# Patient Record
Sex: FEMALE | Race: WHITE | NOT HISPANIC OR LATINO
[De-identification: names, ages, dates, MRNs, and addresses within clinical notes are randomized per-mention and may not be internally consistent; named-entity substitution may affect disease eponyms.]

---

## 2018-01-06 ENCOUNTER — TRANSCRIPTION ENCOUNTER (OUTPATIENT)
Age: 33
End: 2018-01-06

## 2018-09-06 ENCOUNTER — TRANSCRIPTION ENCOUNTER (OUTPATIENT)
Age: 33
End: 2018-09-06

## 2018-09-07 ENCOUNTER — EMERGENCY (EMERGENCY)
Facility: HOSPITAL | Age: 33
LOS: 1 days | Discharge: HOME | End: 2018-09-07
Attending: EMERGENCY MEDICINE
Payer: COMMERCIAL

## 2018-09-07 VITALS
DIASTOLIC BLOOD PRESSURE: 87 MMHG | TEMPERATURE: 98 F | SYSTOLIC BLOOD PRESSURE: 135 MMHG | RESPIRATION RATE: 16 BRPM | HEIGHT: 62 IN | HEART RATE: 78 BPM | WEIGHT: 115.08 LBS | OXYGEN SATURATION: 99 %

## 2018-09-07 PROCEDURE — 99283 EMERGENCY DEPT VISIT LOW MDM: CPT

## 2018-09-07 PROCEDURE — 12011 RPR F/E/E/N/L/M 2.5 CM/<: CPT

## 2018-09-07 PROCEDURE — 99285 EMERGENCY DEPT VISIT HI MDM: CPT | Mod: 25

## 2018-09-07 NOTE — ED PROVIDER NOTE - OBJECTIVE STATEMENT
34yo female pt, no PMHx, ambulatory c/o left eyebrow laceration s/p injury today. Pt stated she walked into a glass door and noticed a small laceration on left eyebrow at work. Denies LOC, eye pain or visual changes. Denies other injuries. Denies headache, dizziness or N/V. Denies sensory changes or weakness to extremities. Denies neck or back pain. Denies CP/SOB/ABD pain. Denies fever, chills or recent sickness. Tdap- UTDed. 32yo female pt, no PMHx, ambulatory c/o left eyebrow laceration s/p injury today. Pt stated she walked into a glass door and noticed a small laceration on left eyebrow at work. Denies LOC, eye pain or visual changes. Denies other injuries. Denies headache, dizziness or N/V. Denies sensory changes or weakness to extremities. Denies neck or back pain. Denies CP/SOB/ABD pain. Denies fever, chills or recent sickness. Tdap- UTDed.       Attending note. Patient was seen in the fast track #3. Agree with the above. Patient's complaint laceration above the left brow which he walked into glass wall this evening. Patient has no headache, dizziness, nausea or vomiting. Patient has no neck pain or change in vision. Patient states her tetanus is up-to-date.  Patient request plastics.

## 2018-09-07 NOTE — ED PROVIDER NOTE - PHYSICAL EXAMINATION
NAD, VSS, Afebrile, + PERRL with full EOMs, + Left eye brow 1cm superficial laceration, no bleeding, No facial patt tender, No spinal tender, Neuro- intact. NAD, VSS, Afebrile, + PERRL with full EOMs, + Left eye brow 1cm superficial laceration, no bleeding, No facial patt tender, No spinal tender, Neuro- intact.       Attending note. Patient is alert and in no acute distress. Patient is a 1.5 cm laceration of the left brow. Pupils are 2 mm equal reactive. Extraocular muscles are intact. TMJ is nontender. Maxilla and nose are nontender. Next nontender.

## 2018-09-07 NOTE — ED PROVIDER NOTE - MEDICAL DECISION MAKING DETAILS
Attending note-laceration of left brow 1.5 cm. Patient requested a plastic surgeon. Tetanus is up-to-date.

## 2018-09-07 NOTE — ED ADULT NURSE NOTE - OBJECTIVE STATEMENT
34 yo female complaining of laceration on left eyebrow "I was walking in the bank and did not see the glass door and this happened". Pt did not LOC, denies vomiting, the glasses in between the door and her face made the lac. No active bleeding, MD at the bedside, vitals WNL, no sings of acute distress noted at this moment. Will continue to monitor closely.

## 2018-09-07 NOTE — ED ADULT NURSE NOTE - NSIMPLEMENTINTERV_GEN_ALL_ED
Implemented All Universal Safety Interventions:  [place] to call system. Call bell, personal items and telephone within reach. Instruct patient to call for assistance. Room bathroom lighting operational. Non-slip footwear when patient is off stretcher. Physically safe environment: no spills, clutter or unnecessary equipment. Stretcher in lowest position, wheels locked, appropriate side rails in place.

## 2019-03-26 ENCOUNTER — RESULT REVIEW (OUTPATIENT)
Age: 34
End: 2019-03-26

## 2019-06-05 ENCOUNTER — APPOINTMENT (OUTPATIENT)
Dept: ANTEPARTUM | Facility: CLINIC | Age: 34
End: 2019-06-05
Payer: COMMERCIAL

## 2019-06-05 ENCOUNTER — ASOB RESULT (OUTPATIENT)
Age: 34
End: 2019-06-05

## 2019-06-05 PROCEDURE — 99201 OFFICE OUTPATIENT NEW 10 MINUTES: CPT | Mod: 25

## 2019-06-05 PROCEDURE — 76805 OB US >/= 14 WKS SNGL FETUS: CPT

## 2019-06-24 ENCOUNTER — APPOINTMENT (OUTPATIENT)
Dept: ANTEPARTUM | Facility: CLINIC | Age: 34
End: 2019-06-24
Payer: COMMERCIAL

## 2019-06-24 ENCOUNTER — ASOB RESULT (OUTPATIENT)
Age: 34
End: 2019-06-24

## 2019-06-24 ENCOUNTER — LABORATORY RESULT (OUTPATIENT)
Age: 34
End: 2019-06-24

## 2019-06-24 ENCOUNTER — OUTPATIENT (OUTPATIENT)
Dept: OUTPATIENT SERVICES | Facility: HOSPITAL | Age: 34
LOS: 1 days | End: 2019-06-24
Payer: COMMERCIAL

## 2019-06-24 DIAGNOSIS — Z01.818 ENCOUNTER FOR OTHER PREPROCEDURAL EXAMINATION: ICD-10-CM

## 2019-06-24 PROCEDURE — 88235 TISSUE CULTURE PLACENTA: CPT

## 2019-06-24 PROCEDURE — 88285 CHROMOSOME COUNT ADDITIONAL: CPT

## 2019-06-24 PROCEDURE — 59000 AMNIOCENTESIS DIAGNOSTIC: CPT

## 2019-06-24 PROCEDURE — 99213 OFFICE O/P EST LOW 20 MIN: CPT | Mod: 25

## 2019-06-24 PROCEDURE — 88291 CYTO/MOLECULAR REPORT: CPT

## 2019-06-24 PROCEDURE — 76811 OB US DETAILED SNGL FETUS: CPT

## 2019-06-24 PROCEDURE — 88275 CYTOGENETICS 100-300: CPT

## 2019-06-24 PROCEDURE — 36415 COLL VENOUS BLD VENIPUNCTURE: CPT

## 2019-06-24 PROCEDURE — 88280 CHROMOSOME KARYOTYPE STUDY: CPT

## 2019-06-24 PROCEDURE — 76817 TRANSVAGINAL US OBSTETRIC: CPT | Mod: 59

## 2019-06-24 PROCEDURE — 88269 CHROMOSOME ANALYS AMNIOTIC: CPT

## 2019-06-24 PROCEDURE — 88271 CYTOGENETICS DNA PROBE: CPT

## 2019-06-25 LAB — SUBTELOMERE ANALYSIS BLD/T FISH-IMP: SIGNIFICANT CHANGE UP

## 2019-07-01 LAB — CHROM ANALY OVERALL INTERP SPEC-IMP: SIGNIFICANT CHANGE UP

## 2019-07-08 ENCOUNTER — APPOINTMENT (OUTPATIENT)
Dept: ANTEPARTUM | Facility: CLINIC | Age: 34
End: 2019-07-08
Payer: COMMERCIAL

## 2019-07-08 ENCOUNTER — ASOB RESULT (OUTPATIENT)
Age: 34
End: 2019-07-08

## 2019-07-08 PROCEDURE — 93325 DOPPLER ECHO COLOR FLOW MAPG: CPT

## 2019-07-08 PROCEDURE — 76827 ECHO EXAM OF FETAL HEART: CPT

## 2019-07-08 PROCEDURE — 76825 ECHO EXAM OF FETAL HEART: CPT

## 2019-07-29 ENCOUNTER — APPOINTMENT (OUTPATIENT)
Dept: ANTEPARTUM | Facility: CLINIC | Age: 34
End: 2019-07-29
Payer: COMMERCIAL

## 2019-07-29 ENCOUNTER — ASOB RESULT (OUTPATIENT)
Age: 34
End: 2019-07-29

## 2019-07-29 PROCEDURE — 76816 OB US FOLLOW-UP PER FETUS: CPT

## 2019-08-26 ENCOUNTER — ASOB RESULT (OUTPATIENT)
Age: 34
End: 2019-08-26

## 2019-08-26 ENCOUNTER — APPOINTMENT (OUTPATIENT)
Dept: ANTEPARTUM | Facility: CLINIC | Age: 34
End: 2019-08-26
Payer: COMMERCIAL

## 2019-08-26 PROCEDURE — 76816 OB US FOLLOW-UP PER FETUS: CPT

## 2019-09-20 ENCOUNTER — APPOINTMENT (OUTPATIENT)
Dept: ANTEPARTUM | Facility: CLINIC | Age: 34
End: 2019-09-20
Payer: COMMERCIAL

## 2019-09-20 ENCOUNTER — ASOB RESULT (OUTPATIENT)
Age: 34
End: 2019-09-20

## 2019-09-20 PROCEDURE — 76816 OB US FOLLOW-UP PER FETUS: CPT

## 2019-10-01 ENCOUNTER — ASOB RESULT (OUTPATIENT)
Age: 34
End: 2019-10-01

## 2019-10-01 ENCOUNTER — APPOINTMENT (OUTPATIENT)
Dept: ANTEPARTUM | Facility: CLINIC | Age: 34
End: 2019-10-01
Payer: COMMERCIAL

## 2019-10-01 PROCEDURE — 76820 UMBILICAL ARTERY ECHO: CPT

## 2019-10-01 PROCEDURE — 76819 FETAL BIOPHYS PROFIL W/O NST: CPT

## 2019-10-08 ENCOUNTER — APPOINTMENT (OUTPATIENT)
Dept: ANTEPARTUM | Facility: CLINIC | Age: 34
End: 2019-10-08
Payer: COMMERCIAL

## 2019-10-08 ENCOUNTER — ASOB RESULT (OUTPATIENT)
Age: 34
End: 2019-10-08

## 2019-10-08 PROCEDURE — 76819 FETAL BIOPHYS PROFIL W/O NST: CPT

## 2019-10-08 PROCEDURE — 76820 UMBILICAL ARTERY ECHO: CPT

## 2019-10-15 ENCOUNTER — ASOB RESULT (OUTPATIENT)
Age: 34
End: 2019-10-15

## 2019-10-15 ENCOUNTER — APPOINTMENT (OUTPATIENT)
Dept: ANTEPARTUM | Facility: CLINIC | Age: 34
End: 2019-10-15
Payer: COMMERCIAL

## 2019-10-15 ENCOUNTER — OUTPATIENT (OUTPATIENT)
Dept: OUTPATIENT SERVICES | Facility: HOSPITAL | Age: 34
LOS: 1 days | End: 2019-10-15
Payer: COMMERCIAL

## 2019-10-15 DIAGNOSIS — Z01.818 ENCOUNTER FOR OTHER PREPROCEDURAL EXAMINATION: ICD-10-CM

## 2019-10-15 PROCEDURE — 76820 UMBILICAL ARTERY ECHO: CPT | Mod: 26

## 2019-10-15 PROCEDURE — 76816 OB US FOLLOW-UP PER FETUS: CPT

## 2019-10-15 PROCEDURE — 76818 FETAL BIOPHYS PROFILE W/NST: CPT

## 2019-10-15 PROCEDURE — 76818 FETAL BIOPHYS PROFILE W/NST: CPT | Mod: 26

## 2019-10-15 PROCEDURE — 76820 UMBILICAL ARTERY ECHO: CPT

## 2019-10-18 ENCOUNTER — APPOINTMENT (OUTPATIENT)
Dept: ANTEPARTUM | Facility: CLINIC | Age: 34
End: 2019-10-18

## 2019-10-22 ENCOUNTER — APPOINTMENT (OUTPATIENT)
Dept: ANTEPARTUM | Facility: CLINIC | Age: 34
End: 2019-10-22
Payer: COMMERCIAL

## 2019-10-22 ENCOUNTER — ASOB RESULT (OUTPATIENT)
Age: 34
End: 2019-10-22

## 2019-10-22 ENCOUNTER — OUTPATIENT (OUTPATIENT)
Dept: OUTPATIENT SERVICES | Facility: HOSPITAL | Age: 34
LOS: 1 days | End: 2019-10-22
Payer: COMMERCIAL

## 2019-10-22 DIAGNOSIS — Z01.818 ENCOUNTER FOR OTHER PREPROCEDURAL EXAMINATION: ICD-10-CM

## 2019-10-22 PROCEDURE — 76818 FETAL BIOPHYS PROFILE W/NST: CPT | Mod: 26

## 2019-10-22 PROCEDURE — 76820 UMBILICAL ARTERY ECHO: CPT | Mod: 26

## 2019-10-22 PROCEDURE — 76818 FETAL BIOPHYS PROFILE W/NST: CPT

## 2019-10-22 PROCEDURE — 76820 UMBILICAL ARTERY ECHO: CPT

## 2019-10-29 ENCOUNTER — ASOB RESULT (OUTPATIENT)
Age: 34
End: 2019-10-29

## 2019-10-29 ENCOUNTER — OUTPATIENT (OUTPATIENT)
Dept: OUTPATIENT SERVICES | Facility: HOSPITAL | Age: 34
LOS: 1 days | End: 2019-10-29
Payer: COMMERCIAL

## 2019-10-29 ENCOUNTER — APPOINTMENT (OUTPATIENT)
Dept: ANTEPARTUM | Facility: CLINIC | Age: 34
End: 2019-10-29
Payer: COMMERCIAL

## 2019-10-29 ENCOUNTER — APPOINTMENT (OUTPATIENT)
Dept: ANTEPARTUM | Facility: CLINIC | Age: 34
End: 2019-10-29

## 2019-10-29 DIAGNOSIS — Z01.818 ENCOUNTER FOR OTHER PREPROCEDURAL EXAMINATION: ICD-10-CM

## 2019-10-29 PROCEDURE — 76820 UMBILICAL ARTERY ECHO: CPT

## 2019-10-29 PROCEDURE — 76820 UMBILICAL ARTERY ECHO: CPT | Mod: 26

## 2019-10-29 PROCEDURE — 76816 OB US FOLLOW-UP PER FETUS: CPT

## 2019-10-29 PROCEDURE — 76818 FETAL BIOPHYS PROFILE W/NST: CPT | Mod: 26

## 2019-10-29 PROCEDURE — 76818 FETAL BIOPHYS PROFILE W/NST: CPT

## 2019-11-05 ENCOUNTER — INPATIENT (INPATIENT)
Facility: HOSPITAL | Age: 34
LOS: 1 days | Discharge: HOME | End: 2019-11-07
Attending: SPECIALIST | Admitting: SPECIALIST
Payer: COMMERCIAL

## 2019-11-05 VITALS — WEIGHT: 130.07 LBS | HEIGHT: 62 IN

## 2019-11-05 DIAGNOSIS — O35.8XX0 MATERNAL CARE FOR OTHER (SUSPECTED) FETAL ABNORMALITY AND DAMAGE, NOT APPLICABLE OR UNSPECIFIED: ICD-10-CM

## 2019-11-05 LAB
BASOPHILS # BLD AUTO: 0.03 K/UL — SIGNIFICANT CHANGE UP (ref 0–0.2)
BASOPHILS NFR BLD AUTO: 0.5 % — SIGNIFICANT CHANGE UP (ref 0–2)
BLD GP AB SCN SERPL QL: NEGATIVE — SIGNIFICANT CHANGE UP
EOSINOPHIL # BLD AUTO: 0.08 K/UL — SIGNIFICANT CHANGE UP (ref 0–0.5)
EOSINOPHIL NFR BLD AUTO: 1.3 % — SIGNIFICANT CHANGE UP (ref 0–6)
HCT VFR BLD CALC: 34.5 % — SIGNIFICANT CHANGE UP (ref 34.5–45)
HGB BLD-MCNC: 11.3 G/DL — LOW (ref 11.5–15.5)
IMM GRANULOCYTES NFR BLD AUTO: 0.3 % — SIGNIFICANT CHANGE UP (ref 0–1.5)
LYMPHOCYTES # BLD AUTO: 1.72 K/UL — SIGNIFICANT CHANGE UP (ref 1–3.3)
LYMPHOCYTES # BLD AUTO: 27.6 % — SIGNIFICANT CHANGE UP (ref 13–44)
MCHC RBC-ENTMCNC: 28.2 PG — SIGNIFICANT CHANGE UP (ref 27–34)
MCHC RBC-ENTMCNC: 32.8 GM/DL — SIGNIFICANT CHANGE UP (ref 32–36)
MCV RBC AUTO: 86 FL — SIGNIFICANT CHANGE UP (ref 80–100)
MONOCYTES # BLD AUTO: 0.42 K/UL — SIGNIFICANT CHANGE UP (ref 0–0.9)
MONOCYTES NFR BLD AUTO: 6.7 % — SIGNIFICANT CHANGE UP (ref 2–14)
NEUTROPHILS # BLD AUTO: 3.96 K/UL — SIGNIFICANT CHANGE UP (ref 1.8–7.4)
NEUTROPHILS NFR BLD AUTO: 63.6 % — SIGNIFICANT CHANGE UP (ref 43–77)
NRBC # BLD: 0 /100 WBCS — SIGNIFICANT CHANGE UP (ref 0–0)
PLATELET # BLD AUTO: 307 K/UL — SIGNIFICANT CHANGE UP (ref 150–400)
RBC # BLD: 4.01 M/UL — SIGNIFICANT CHANGE UP (ref 3.8–5.2)
RBC # FLD: 13.2 % — SIGNIFICANT CHANGE UP (ref 10.3–14.5)
RH IG SCN BLD-IMP: POSITIVE — SIGNIFICANT CHANGE UP
RH IG SCN BLD-IMP: POSITIVE — SIGNIFICANT CHANGE UP
T PALLIDUM AB TITR SER: NEGATIVE — SIGNIFICANT CHANGE UP
WBC # BLD: 6.23 K/UL — SIGNIFICANT CHANGE UP (ref 3.8–10.5)
WBC # FLD AUTO: 6.23 K/UL — SIGNIFICANT CHANGE UP (ref 3.8–10.5)

## 2019-11-05 PROCEDURE — 88307 TISSUE EXAM BY PATHOLOGIST: CPT | Mod: 26

## 2019-11-05 RX ORDER — OXYTOCIN 10 UNIT/ML
2 VIAL (ML) INJECTION
Qty: 30 | Refills: 0 | Status: DISCONTINUED | OUTPATIENT
Start: 2019-11-05 | End: 2019-11-07

## 2019-11-05 RX ORDER — SODIUM CHLORIDE 9 MG/ML
1000 INJECTION, SOLUTION INTRAVENOUS
Refills: 0 | Status: DISCONTINUED | OUTPATIENT
Start: 2019-11-05 | End: 2019-11-07

## 2019-11-05 RX ORDER — AMPICILLIN TRIHYDRATE 250 MG
1 CAPSULE ORAL EVERY 4 HOURS
Refills: 0 | Status: DISCONTINUED | OUTPATIENT
Start: 2019-11-05 | End: 2019-11-05

## 2019-11-05 RX ORDER — OXYTOCIN 10 UNIT/ML
333.33 VIAL (ML) INJECTION
Qty: 20 | Refills: 0 | Status: DISCONTINUED | OUTPATIENT
Start: 2019-11-05 | End: 2019-11-07

## 2019-11-05 RX ORDER — SODIUM CHLORIDE 9 MG/ML
1000 INJECTION, SOLUTION INTRAVENOUS
Refills: 0 | Status: DISCONTINUED | OUTPATIENT
Start: 2019-11-05 | End: 2019-11-05

## 2019-11-05 RX ORDER — AMPICILLIN TRIHYDRATE 250 MG
2 CAPSULE ORAL ONCE
Refills: 0 | Status: COMPLETED | OUTPATIENT
Start: 2019-11-05 | End: 2019-11-05

## 2019-11-05 RX ORDER — CITRIC ACID/SODIUM CITRATE 300-500 MG
15 SOLUTION, ORAL ORAL EVERY 6 HOURS
Refills: 0 | Status: DISCONTINUED | OUTPATIENT
Start: 2019-11-05 | End: 2019-11-05

## 2019-11-05 RX ADMIN — Medication 216 GRAM(S): at 06:05

## 2019-11-05 RX ADMIN — Medication 108 GRAM(S): at 14:29

## 2019-11-05 RX ADMIN — Medication 108 GRAM(S): at 18:26

## 2019-11-05 RX ADMIN — SODIUM CHLORIDE 125 MILLILITER(S): 9 INJECTION, SOLUTION INTRAVENOUS at 06:06

## 2019-11-05 RX ADMIN — Medication 108 GRAM(S): at 10:35

## 2019-11-05 NOTE — PATIENT PROFILE OB - BABY A: DATE/TIME OF DELIVERY
Full range of motion of upper and lower extremities, no joint tenderness/swelling.
05-Nov-2019 18:58

## 2019-11-06 LAB
HCT VFR BLD CALC: 31.8 % — LOW (ref 34.5–45)
HGB BLD-MCNC: 10.2 G/DL — LOW (ref 11.5–15.5)

## 2019-11-06 RX ORDER — IBUPROFEN 200 MG
600 TABLET ORAL EVERY 6 HOURS
Refills: 0 | Status: DISCONTINUED | OUTPATIENT
Start: 2019-11-06 | End: 2019-11-07

## 2019-11-06 RX ORDER — DIPHENHYDRAMINE HCL 50 MG
25 CAPSULE ORAL EVERY 6 HOURS
Refills: 0 | Status: DISCONTINUED | OUTPATIENT
Start: 2019-11-06 | End: 2019-11-07

## 2019-11-06 RX ORDER — TETANUS TOXOID, REDUCED DIPHTHERIA TOXOID AND ACELLULAR PERTUSSIS VACCINE, ADSORBED 5; 2.5; 8; 8; 2.5 [IU]/.5ML; [IU]/.5ML; UG/.5ML; UG/.5ML; UG/.5ML
0.5 SUSPENSION INTRAMUSCULAR ONCE
Refills: 0 | Status: DISCONTINUED | OUTPATIENT
Start: 2019-11-06 | End: 2019-11-07

## 2019-11-06 RX ORDER — GLYCERIN ADULT
1 SUPPOSITORY, RECTAL RECTAL AT BEDTIME
Refills: 0 | Status: DISCONTINUED | OUTPATIENT
Start: 2019-11-06 | End: 2019-11-07

## 2019-11-06 RX ORDER — DIBUCAINE 1 %
1 OINTMENT (GRAM) RECTAL EVERY 6 HOURS
Refills: 0 | Status: DISCONTINUED | OUTPATIENT
Start: 2019-11-06 | End: 2019-11-07

## 2019-11-06 RX ORDER — KETOROLAC TROMETHAMINE 30 MG/ML
30 SYRINGE (ML) INJECTION ONCE
Refills: 0 | Status: DISCONTINUED | OUTPATIENT
Start: 2019-11-06 | End: 2019-11-06

## 2019-11-06 RX ORDER — LANOLIN
1 OINTMENT (GRAM) TOPICAL EVERY 6 HOURS
Refills: 0 | Status: DISCONTINUED | OUTPATIENT
Start: 2019-11-06 | End: 2019-11-07

## 2019-11-06 RX ORDER — PRAMOXINE HYDROCHLORIDE 150 MG/15G
1 AEROSOL, FOAM RECTAL EVERY 4 HOURS
Refills: 0 | Status: DISCONTINUED | OUTPATIENT
Start: 2019-11-06 | End: 2019-11-07

## 2019-11-06 RX ORDER — ACETAMINOPHEN 500 MG
975 TABLET ORAL
Refills: 0 | Status: DISCONTINUED | OUTPATIENT
Start: 2019-11-06 | End: 2019-11-07

## 2019-11-06 RX ORDER — SODIUM CHLORIDE 9 MG/ML
3 INJECTION INTRAMUSCULAR; INTRAVENOUS; SUBCUTANEOUS EVERY 8 HOURS
Refills: 0 | Status: DISCONTINUED | OUTPATIENT
Start: 2019-11-06 | End: 2019-11-07

## 2019-11-06 RX ORDER — IBUPROFEN 200 MG
600 TABLET ORAL EVERY 6 HOURS
Refills: 0 | Status: COMPLETED | OUTPATIENT
Start: 2019-11-06 | End: 2020-10-04

## 2019-11-06 RX ORDER — OXYCODONE HYDROCHLORIDE 5 MG/1
5 TABLET ORAL
Refills: 0 | Status: DISCONTINUED | OUTPATIENT
Start: 2019-11-06 | End: 2019-11-07

## 2019-11-06 RX ORDER — OXYCODONE HYDROCHLORIDE 5 MG/1
5 TABLET ORAL ONCE
Refills: 0 | Status: DISCONTINUED | OUTPATIENT
Start: 2019-11-06 | End: 2019-11-07

## 2019-11-06 RX ORDER — AER TRAVELER 0.5 G/1
1 SOLUTION RECTAL; TOPICAL EVERY 4 HOURS
Refills: 0 | Status: DISCONTINUED | OUTPATIENT
Start: 2019-11-06 | End: 2019-11-07

## 2019-11-06 RX ORDER — SIMETHICONE 80 MG/1
80 TABLET, CHEWABLE ORAL EVERY 4 HOURS
Refills: 0 | Status: DISCONTINUED | OUTPATIENT
Start: 2019-11-06 | End: 2019-11-07

## 2019-11-06 RX ORDER — HYDROCORTISONE 1 %
1 OINTMENT (GRAM) TOPICAL EVERY 6 HOURS
Refills: 0 | Status: DISCONTINUED | OUTPATIENT
Start: 2019-11-06 | End: 2019-11-07

## 2019-11-06 RX ORDER — MAGNESIUM HYDROXIDE 400 MG/1
30 TABLET, CHEWABLE ORAL
Refills: 0 | Status: DISCONTINUED | OUTPATIENT
Start: 2019-11-06 | End: 2019-11-07

## 2019-11-06 RX ORDER — BENZOCAINE 10 %
1 GEL (GRAM) MUCOUS MEMBRANE EVERY 6 HOURS
Refills: 0 | Status: DISCONTINUED | OUTPATIENT
Start: 2019-11-06 | End: 2019-11-07

## 2019-11-06 RX ADMIN — Medication 975 MILLIGRAM(S): at 16:54

## 2019-11-06 RX ADMIN — Medication 600 MILLIGRAM(S): at 11:29

## 2019-11-06 RX ADMIN — Medication 975 MILLIGRAM(S): at 20:46

## 2019-11-06 RX ADMIN — Medication 975 MILLIGRAM(S): at 16:13

## 2019-11-06 RX ADMIN — Medication 600 MILLIGRAM(S): at 18:25

## 2019-11-06 RX ADMIN — Medication 975 MILLIGRAM(S): at 21:16

## 2019-11-06 RX ADMIN — SODIUM CHLORIDE 3 MILLILITER(S): 9 INJECTION INTRAMUSCULAR; INTRAVENOUS; SUBCUTANEOUS at 06:20

## 2019-11-06 RX ADMIN — Medication 30 MILLIGRAM(S): at 01:20

## 2019-11-06 RX ADMIN — Medication 600 MILLIGRAM(S): at 13:15

## 2019-11-06 RX ADMIN — Medication 975 MILLIGRAM(S): at 03:48

## 2019-11-06 RX ADMIN — Medication 1 TABLET(S): at 11:29

## 2019-11-06 RX ADMIN — Medication 975 MILLIGRAM(S): at 03:18

## 2019-11-06 RX ADMIN — Medication 30 MILLIGRAM(S): at 01:50

## 2019-11-06 NOTE — PROGRESS NOTE ADULT - PROBLEM SELECTOR PLAN 1
- Pain well controlled, continue current pain regimen  - Increase ambulation, SCDs when not ambulating  - Continue regular diet    Char PGY1

## 2019-11-06 NOTE — PROGRESS NOTE ADULT - SUBJECTIVE AND OBJECTIVE BOX
OB Progress Note:  PPD#1    S: 33yo  PPD#1 s/p . Patient feels well. Pain is well controlled. She is tolerating a regular diet and passing flatus. She is voiding spontaneously, and ambulating without difficulty. Denies CP/SOB. Denies lightheadedness/dizziness. Denies N/V.    O:  Vitals:  Vital Signs Last 24 Hrs  T(C): 36.7 (2019 05:38), Max: 37.1 (2019 21:25)  T(F): 98.1 (2019 05:38), Max: 98.8 (2019 21:25)  HR: 71 (2019 05:38) (66 - 91)  BP: 92/61 (2019 05:38) (92/61 - 129/67)  BP(mean): 94 (2019 21:25) (85 - 94)  RR: 18 (2019 05:38) (18 - 18)  SpO2: 99% (2019 05:38) (97% - 100%)    MEDICATIONS  (STANDING):  acetaminophen   Tablet .. 975 milliGRAM(s) Oral <User Schedule>  dextrose 5% + lactated ringers. 1000 milliLiter(s) (125 mL/Hr) IV Continuous <Continuous>  diphtheria/tetanus/pertussis (acellular) Vaccine (ADAcel) 0.5 milliLiter(s) IntraMuscular once  ibuprofen  Tablet. 600 milliGRAM(s) Oral every 6 hours  oxytocin Infusion 2 milliUNIT(s)/Min (2 mL/Hr) IV Continuous <Continuous>  oxytocin Infusion 333.333 milliUNIT(s)/Min (1000 mL/Hr) IV Continuous <Continuous>  prenatal multivitamin 1 Tablet(s) Oral daily  sodium chloride 0.9% lock flush 3 milliLiter(s) IV Push every 8 hours      Labs:  Blood type: A Positive  Rubella IgG: RPR: Negative                          11.3<L>   6.23 >-----------< 307    (  @ 06:34 )             34.5                  Physical Exam:  General: NAD  Abdomen: soft, non-tender, non-distended, fundus firm  Vaginal: Lochia wnl  Extremities: No erythema/edema

## 2019-11-07 ENCOUNTER — TRANSCRIPTION ENCOUNTER (OUTPATIENT)
Age: 34
End: 2019-11-07

## 2019-11-07 VITALS
OXYGEN SATURATION: 98 % | RESPIRATION RATE: 18 BRPM | TEMPERATURE: 98 F | HEART RATE: 72 BPM | SYSTOLIC BLOOD PRESSURE: 107 MMHG | DIASTOLIC BLOOD PRESSURE: 72 MMHG

## 2019-11-07 PROCEDURE — 59025 FETAL NON-STRESS TEST: CPT

## 2019-11-07 PROCEDURE — 85018 HEMOGLOBIN: CPT

## 2019-11-07 PROCEDURE — 86780 TREPONEMA PALLIDUM: CPT

## 2019-11-07 PROCEDURE — 59050 FETAL MONITOR W/REPORT: CPT

## 2019-11-07 PROCEDURE — 86901 BLOOD TYPING SEROLOGIC RH(D): CPT

## 2019-11-07 PROCEDURE — 85014 HEMATOCRIT: CPT

## 2019-11-07 PROCEDURE — 86900 BLOOD TYPING SEROLOGIC ABO: CPT

## 2019-11-07 PROCEDURE — 86850 RBC ANTIBODY SCREEN: CPT

## 2019-11-07 PROCEDURE — 88307 TISSUE EXAM BY PATHOLOGIST: CPT

## 2019-11-07 PROCEDURE — 85027 COMPLETE CBC AUTOMATED: CPT

## 2019-11-07 RX ORDER — ACETAMINOPHEN 500 MG
3 TABLET ORAL
Qty: 0 | Refills: 0 | DISCHARGE
Start: 2019-11-07

## 2019-11-07 RX ADMIN — Medication 600 MILLIGRAM(S): at 00:47

## 2019-11-07 RX ADMIN — Medication 975 MILLIGRAM(S): at 03:30

## 2019-11-07 RX ADMIN — Medication 600 MILLIGRAM(S): at 06:46

## 2019-11-07 RX ADMIN — Medication 975 MILLIGRAM(S): at 03:00

## 2019-11-07 RX ADMIN — Medication 600 MILLIGRAM(S): at 07:16

## 2019-11-07 RX ADMIN — Medication 975 MILLIGRAM(S): at 09:38

## 2019-11-07 RX ADMIN — Medication 600 MILLIGRAM(S): at 00:17

## 2019-11-07 RX ADMIN — Medication 975 MILLIGRAM(S): at 10:10

## 2019-11-07 NOTE — DISCHARGE NOTE OB - CARE PLAN
Principal Discharge DX:	Normal spontaneous vaginal delivery  Goal:	home  Assessment and plan of treatment:	status post  and nl postpartum care

## 2019-11-07 NOTE — DISCHARGE NOTE OB - PATIENT PORTAL LINK FT
You can access the FollowMyHealth Patient Portal offered by [facility] by registering at the following website: [email or web address]. By joining [facility]’s FollowMyHealth portal, you will also be able to view your health information using other applications (apps) compatible with our system.

## 2019-11-07 NOTE — DISCHARGE NOTE OB - CARE PROVIDER_API CALL
Monica Jack)  OBSGYN  General  [facility] [id number]  Phone: [phone]  Fax: [phone]  Follow Up Time:

## 2019-11-07 NOTE — PROGRESS NOTE ADULT - SUBJECTIVE AND OBJECTIVE BOX
DELFINO HERNANDEZ  MRN-[id number]  34y    Subjective:  Pt is doing well.  AMbulating, tolerating diet, lochia wnl, no HA< no visual changes.  Pain controlled,    Vital Signs Last 24 Hrs  T(C): 36.5 (07 Nov 2019 05:00), Max: 36.8 (06 Nov 2019 17:33)  T(F): 97.7 (07 Nov 2019 05:00), Max: 98.3 (06 Nov 2019 17:33)  HR: 72 (07 Nov 2019 05:00) (72 - 90)  BP: 107/72 (07 Nov 2019 05:00) (107/72 - 109/73)  BP(mean): --  RR: 18 (07 Nov 2019 05:00) (18 - 18)  SpO2: 98% (07 Nov 2019 05:00) (98% - 98%)    I&O's Summary                            10.2   x     )-----------( x        ( 06 Nov 2019 09:09 )             31.8       Allergies    No Known Allergies    Intolerances        MEDICATIONS  (STANDING):  acetaminophen   Tablet .. 975 milliGRAM(s) Oral <User Schedule>  dextrose 5% + lactated ringers. 1000 milliLiter(s) (125 mL/Hr) IV Continuous <Continuous>  diphtheria/tetanus/pertussis (acellular) Vaccine (ADAcel) 0.5 milliLiter(s) IntraMuscular once  ibuprofen  Tablet. 600 milliGRAM(s) Oral every 6 hours  oxytocin Infusion 2 milliUNIT(s)/Min (2 mL/Hr) IV Continuous <Continuous>  oxytocin Infusion 333.333 milliUNIT(s)/Min (1000 mL/Hr) IV Continuous <Continuous>  prenatal multivitamin 1 Tablet(s) Oral daily  sodium chloride 0.9% lock flush 3 milliLiter(s) IV Push every 8 hours    MEDICATIONS  (PRN):  benzocaine 20%/menthol 0.5% Spray 1 Spray(s) Topical every 6 hours PRN for Perineal discomfort  dibucaine 1% Ointment 1 Application(s) Topical every 6 hours PRN Perineal discomfort  diphenhydrAMINE 25 milliGRAM(s) Oral every 6 hours PRN Pruritus  glycerin Suppository - Adult 1 Suppository(s) Rectal at bedtime PRN Constipation  hydrocortisone 1% Cream 1 Application(s) Topical every 6 hours PRN Moderate Pain (4-6)  lanolin Ointment 1 Application(s) Topical every 6 hours PRN nipple soreness  magnesium hydroxide Suspension 30 milliLiter(s) Oral two times a day PRN Constipation  oxyCODONE    IR 5 milliGRAM(s) Oral every 3 hours PRN Moderate to Severe Pain (4-10)  oxyCODONE    IR 5 milliGRAM(s) Oral once PRN Moderate to Severe Pain (4-10)  pramoxine 1%/zinc 5% Cream 1 Application(s) Topical every 4 hours PRN Moderate Pain (4-6)  simethicone 80 milliGRAM(s) Chew every 4 hours PRN Gas  witch hazel Pads 1 Application(s) Topical every 4 hours PRN Perineal discomfort      PHYSICAL EXAM:    Extremities: non-tender bilaterally   Abdomen: soft, nontender, fundus firm,

## 2019-11-07 NOTE — DISCHARGE NOTE OB - MEDICATION SUMMARY - MEDICATIONS TO STOP TAKING
I will STOP taking the medications listed below when I get home from the hospital:    acetaminophen 325 mg oral tablet  -- 3 tab(s) by mouth

## 2019-11-07 NOTE — PROGRESS NOTE ADULT - ASSESSMENT
PPD#2, doing well  Stable for discharge  discharge instructions reviewed as written in EMR  PO analgesia    Yael Ames MD

## 2019-11-07 NOTE — DISCHARGE NOTE OB - MEDICATION SUMMARY - MEDICATIONS TO TAKE
I will START or STAY ON the medications listed below when I get home from the hospital:    acetaminophen 325 mg oral tablet  -- 3 tab(s) by mouth   -- Indication: For pain mgmt

## 2019-12-19 ENCOUNTER — RESULT REVIEW (OUTPATIENT)
Age: 34
End: 2019-12-19

## 2020-01-01 NOTE — PATIENT PROFILE OB - NS PRO DEPRESSION SCREENING Y/N1
[Today's Date] : [unfilled] [FreeTextEntry1] : Normal sinus rhythm, normal QRS axis, normal intervals (QTc 411 msec), no hypertrophy, no pre-excitation, no ST segment or T wave abnormalities. Normal EKG. [FreeTextEntry2] : Normal segmental anatomy, normally-related great vessels. PFO with left to right shunt (normal variant). No PDA. No significant valvar regurgitation (trivial, physiologic TR/PI), stenosis, or outflow obstruction. No ventricular hypertrophy. Normal biventricular function. Normal origins of the coronary arteries. Normal aortic arch and descending aortic Doppler tracing. No pericardial effusion. no

## 2020-12-10 ENCOUNTER — TRANSCRIPTION ENCOUNTER (OUTPATIENT)
Age: 35
End: 2020-12-10

## 2022-10-25 ENCOUNTER — NON-APPOINTMENT (OUTPATIENT)
Age: 37
End: 2022-10-25